# Patient Record
Sex: MALE | Race: WHITE
[De-identification: names, ages, dates, MRNs, and addresses within clinical notes are randomized per-mention and may not be internally consistent; named-entity substitution may affect disease eponyms.]

---

## 2018-08-13 ENCOUNTER — HOSPITAL ENCOUNTER (EMERGENCY)
Dept: HOSPITAL 45 - C.EDB | Age: 73
Discharge: HOME | End: 2018-08-13
Payer: COMMERCIAL

## 2018-08-13 VITALS
HEIGHT: 72.01 IN | HEIGHT: 72.01 IN | BODY MASS INDEX: 25.71 KG/M2 | WEIGHT: 189.82 LBS | WEIGHT: 189.82 LBS | BODY MASS INDEX: 25.71 KG/M2

## 2018-08-13 VITALS — OXYGEN SATURATION: 94 % | HEART RATE: 63 BPM | SYSTOLIC BLOOD PRESSURE: 135 MMHG | DIASTOLIC BLOOD PRESSURE: 79 MMHG

## 2018-08-13 VITALS — TEMPERATURE: 98.06 F

## 2018-08-13 DIAGNOSIS — Z79.899: ICD-10-CM

## 2018-08-13 DIAGNOSIS — Z87.891: ICD-10-CM

## 2018-08-13 DIAGNOSIS — A69.20: Primary | ICD-10-CM

## 2018-08-13 DIAGNOSIS — L03.90: ICD-10-CM

## 2018-08-13 DIAGNOSIS — R21: ICD-10-CM

## 2018-08-13 DIAGNOSIS — Z79.82: ICD-10-CM

## 2018-08-13 LAB
BASOPHILS # BLD: 0.04 K/UL (ref 0–0.2)
BASOPHILS NFR BLD: 0.5 %
BUN SERPL-MCNC: 16 MG/DL (ref 7–18)
CALCIUM SERPL-MCNC: 9.2 MG/DL (ref 8.5–10.1)
CO2 SERPL-SCNC: 28 MMOL/L (ref 21–32)
CREAT SERPL-MCNC: 0.74 MG/DL (ref 0.6–1.4)
EOS ABS #: 0.22 K/UL (ref 0–0.5)
EOSINOPHIL NFR BLD AUTO: 251 K/UL (ref 130–400)
GLUCOSE SERPL-MCNC: 115 MG/DL (ref 70–99)
HCT VFR BLD CALC: 45.3 % (ref 42–52)
HGB BLD-MCNC: 16 G/DL (ref 14–18)
IG#: 0.03 K/UL (ref 0–0.02)
IMM GRANULOCYTES NFR BLD AUTO: 17.3 %
LYMPHOCYTES # BLD: 1.36 K/UL (ref 1.2–3.4)
MCH RBC QN AUTO: 30.5 PG (ref 25–34)
MCHC RBC AUTO-ENTMCNC: 35.3 G/DL (ref 32–36)
MCV RBC AUTO: 86.5 FL (ref 80–100)
MONO ABS #: 0.56 K/UL (ref 0.11–0.59)
MONOCYTES NFR BLD: 7.1 %
NEUT ABS #: 5.67 K/UL (ref 1.4–6.5)
NEUTROPHILS # BLD AUTO: 2.8 %
NEUTROPHILS NFR BLD AUTO: 71.9 %
PMV BLD AUTO: 11.4 FL (ref 7.4–10.4)
POTASSIUM SERPL-SCNC: 3.7 MMOL/L (ref 3.5–5.1)
RED CELL DISTRIBUTION WIDTH CV: 13.7 % (ref 11.5–14.5)
RED CELL DISTRIBUTION WIDTH SD: 43 FL (ref 36.4–46.3)
SODIUM SERPL-SCNC: 138 MMOL/L (ref 136–145)
WBC # BLD AUTO: 7.88 K/UL (ref 4.8–10.8)

## 2018-08-13 NOTE — EMERGENCY ROOM VISIT NOTE
History


Report prepared by Sekou:  Frankie Peterson


Under the Supervision of:  Dr. Uzair Sigala D.O.


First contact with patient:  12:09


Chief Complaint:  SKIN PROBLEM


Stated Complaint:  REDNESS,PAIN,ITCH ON NECK AND BACK





History of Present Illness


The patient is a 72 year old male who presents to the Emergency Room with 

complaints of worsening/spreading neck pain that began about 2 weeks ago. The 

patient's wife at bedside notes that the rash around the patient's neck has 

been spreading over the past 2 weeks and the pain has been spreading with the 

rash. The deny any sunburns or use of heating pads. They have not had any 

recorded fevers. 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





   Source of History:  patient


   Onset:  2 weeks ago


   Position:  neck


   Quality:  other (Rash)


   Timing:  worsening (spreading)


   Associated Symptoms:  No fevers





Review of Systems


See HPI for pertinent positives & negatives. A total of 10 systems reviewed and 

were otherwise negative.





Past Medical & Surgical


Hx of Lyme Meningitis.





Family History


Omitted secondary to patient age.





Social History


Smoking Status:  Former Smoker


Marital Status:  


Housing Status:  lives with significant other


Occupation Status:  retired





Current/Historical Medications


Scheduled


Amlodipine Besylate (Norvasc), 10 MG PO DAILY


Aspirin (Aspirin Ec), 81 MG PO DAILY


Doxycycline Monohydrate (Monodox), 100 MG PO BID


Dutasteride (Avodart), 0.5 MG PO DAILY


Pantoprazole Sodium (Protonix), 40 MG PO BID


Tamsulosin Hcl (Flomax), 0.4 MG PO DAILY


Tizanidine (Zanaflex), 6 MG PO DAILY





Allergies


Coded Allergies:  


     No Known Allergies (Unverified , 8/13/18)





Physical Exam


Vital Signs











  Date Time  Temp Pulse Resp B/P (MAP) Pulse Ox O2 Delivery O2 Flow Rate FiO2


 


8/13/18 14:03  63 18 135/79 94 Room Air  


 


8/13/18 13:39  60 20 136/79 95 Room Air  


 


8/13/18 11:40 36.7 69 18 149/77 95 Room Air  











Physical Exam


CONSTITUTIONAL/VITAL SIGNS: Reviewed / noted above.


GENERAL: Non-toxic in appearance. 


INTEGUMENTARY:There is a well demarcated erythematous area, that extends from 

the bilateral clavicles to the back, into the area of the mid scapula. 


HEAD: Normocephalic.


EYES: without scleral icterus or trauma.


ENT/OROPHARYNX: clear and moist.


LYMPHADENOPATHY/NECK: Is supple without lymphadenopathy or meningismus.


RESPIRATORY: Lungs clear and equal.


CARDIOVASCULAR: Regular rate and rhythm.


GI/ABDOMEN: Soft and nontender. No organomegaly or pulsatile mass. No rebound 

or guarding. Normal bowel sounds.


EXTREMITIES: Warm and well perfused.


BACK: No CVA tenderness.


NEUROLOGICAL: Intact without focal deficits. 


PSYCHIATRIC: normal affect.


MUSCULOSKELETAL: Normally developed with good muscle tone.





Medical Decision & Procedures


Laboratory Results


8/13/18 12:30








Red Blood Count 5.24, Mean Corpuscular Volume 86.5, Mean Corpuscular Hemoglobin 

30.5, Mean Corpuscular Hemoglobin Concent 35.3, Mean Platelet Volume 11.4, 

Neutrophils (%) (Auto) 71.9, Lymphocytes (%) (Auto) 17.3, Monocytes (%) (Auto) 

7.1, Eosinophils (%) (Auto) 2.8, Basophils (%) (Auto) 0.5, Neutrophils # (Auto) 

5.67, Lymphocytes # (Auto) 1.36, Monocytes # (Auto) 0.56, Eosinophils # (Auto) 

0.22, Basophils # (Auto) 0.04





8/13/18 12:30

















Test


  8/13/18


12:30


 


White Blood Count


  7.88 K/uL


(4.8-10.8)


 


Red Blood Count


  5.24 M/uL


(4.7-6.1)


 


Hemoglobin


  16.0 g/dL


(14.0-18.0)


 


Hematocrit 45.3 % (42-52) 


 


Mean Corpuscular Volume


  86.5 fL


()


 


Mean Corpuscular Hemoglobin


  30.5 pg


(25-34)


 


Mean Corpuscular Hemoglobin


Concent 35.3 g/dl


(32-36)


 


Platelet Count


  251 K/uL


(130-400)


 


Mean Platelet Volume


  11.4 fL


(7.4-10.4)


 


Neutrophils (%) (Auto) 71.9 % 


 


Lymphocytes (%) (Auto) 17.3 % 


 


Monocytes (%) (Auto) 7.1 % 


 


Eosinophils (%) (Auto) 2.8 % 


 


Basophils (%) (Auto) 0.5 % 


 


Neutrophils # (Auto)


  5.67 K/uL


(1.4-6.5)


 


Lymphocytes # (Auto)


  1.36 K/uL


(1.2-3.4)


 


Monocytes # (Auto)


  0.56 K/uL


(0.11-0.59)


 


Eosinophils # (Auto)


  0.22 K/uL


(0-0.5)


 


Basophils # (Auto)


  0.04 K/uL


(0-0.2)


 


RDW Standard Deviation


  43.0 fL


(36.4-46.3)


 


RDW Coefficient of Variation


  13.7 %


(11.5-14.5)


 


Immature Granulocyte % (Auto) 0.4 % 


 


Immature Granulocyte # (Auto)


  0.03 K/uL


(0.00-0.02)


 


Anion Gap


  6.0 mmol/L


(3-11)


 


Est Creatinine Clear Calc


Drug Dose 96.1 ml/min 


 


 


Estimated GFR (


American) 105.3 


 


 


Estimated GFR (Non-


American 90.9 


 


 


BUN/Creatinine Ratio 21.8 (10-20) 


 


Calcium Level


  9.2 mg/dl


(8.5-10.1)


 


Lyme Disease IgG Antibody POS (NEG) 





Laboratory results as stated above per my review.





Medications Administered











 Medications


  (Trade)  Dose


 Ordered  Sig/Radha


 Route  Start Time


 Stop Time Status Last Admin


Dose Admin


 


 Ceftriaxone Sodium


  (Rocephin Inj)  1 gm  NOW  STAT


 IV  8/13/18 12:22


 8/13/18 12:24 DC 8/13/18 12:41


1 GM











ED Course


1218: Previous medical records were reviewed. The patient was evaluated in room 

B4B. A complete history and physical examination was performed.





1222: Ordered Rocephin 1 gm IV. 





1408: On reevaluation, the patient is resting in bed. I discussed the results 

and findings with the patient. He verbalized agreement of the treatment plan. 

The patient was discharged home.





Medical Decision


Differential diagnosis:


Etiologies such as contact dermatitis, viral exanthem, urticaria, allergic 

reaction, Palma-Tk syndrome, toxic epidermal necrolysis, erythema 

multiforme, cellulitis, scabies, HSV, varicella, zoster, eczema, staph scalded 

skin syndrome, fungal infection, as well as others were entertained.





This is a 72-year-old male who presents to the ED with a chief complaint of 

some redness of the skin in his upper thorax.  The patient has had the symptoms 

for a few days.  It seems to be worsening.  His vital signs are stable.  He is 

afebrile.  He has not had any fevers, vomiting or other symptoms.  His exam 

shows a well demarcated symmetric lightly erythematous area in the upper thorax 

that starts in the bilateral clavicular area and extends posteriorly into the 

trapezius area and then is lower in the middle of the back extending almost 

horizontally from the lower tip of the scapula to the other scapula.  There is 

mild tenderness and increased warmth to the area.  His CBC is normal, chemistry 

panel was unremarkable.  Lyme IgG was positive.  He does have a history of Lyme 

meningitis.  Lyme IgM was equivocal.  The patient will be discharged on 

doxycycline.  He will follow-up with his doctors.





Medication Reconcilliation


Current Medication List:  was personally reviewed by me





Blood Pressure Screening


Patient's blood pressure:  Elevated blood pressure


Blood pressure disposition:  Elevated BP felt to be situational, Referred to PCP





Impression





 Primary Impression:  


 Rash


 Additional Impressions:  


 Cellulitis


 Lyme disease





Scribe Attestation


The scribe's documentation has been prepared under my direction and personally 

reviewed by me in its entirety. I confirm that the note above accurately 

reflects all work, treatment, procedures, and medical decision making performed 

by me.





Departure Information


Dispostion


Home / Self-Care





Prescriptions





Doxycycline Monohydrate (Monodox) 100 Mg Cap


100 MG PO BID for 21 Days, #42 CAP


   Prov: Uzair Sigala D.O.         8/13/18





Patient Instructions


ED Lyme Disease, My [facility]





Additional Instructions





Your Lyme test today for acute lyme came back equivocal.  This will be sent out 

for further testing. Results will be back in about 1 week. 





Doxycycline as prescribed for 21 days.





Follow-up with your doctor for further care and evaluation in 1 week.  Return 

to the emergency department for worsening or new symptoms or any concerns.


You have been examined and treated today on an emergency basis only. This is 

not a substitute for, or an effort to provide, complete comprehensive medical 

care. It is impossible to recognize and treat all injuries or illnesses in a 

single emergency department visit. It is therefore important that you follow up 

closely with your doctor.  Call as soon as possible for an appointment.





Problem Qualifiers